# Patient Record
Sex: FEMALE | Race: WHITE
[De-identification: names, ages, dates, MRNs, and addresses within clinical notes are randomized per-mention and may not be internally consistent; named-entity substitution may affect disease eponyms.]

---

## 2018-09-28 NOTE — DIAGNOSTIC IMAGING REPORT
EXAMINATION: CT scan of the chest  with contrast.



TECHNIQUE: Helical CT images of the chest were performed from the lung apices

to the level of the adrenal glands after the intravenous administration of 100

cc of Isovue 300.  Coronal and sagittal reformatted images were obtained.Dose

modulation, iterative reconstruction, and/or weight based adjustment of the

mA/kV was utilized to reduce the radiation dose to as low as reasonably

achievable. 



COMPARISON: None.



CLINICAL HISTORY:Shortness of breath, evaluate for pulmonary



DISCUSSION:



LINES/TUBES:  None.



LUNGS AND AIRWAYS:  The lungs are clear.  No pulmonary nodules, masses or

consolidation.    The airways are normal, without endobronchial lesions.



No pulmonary blood.



PLEURA: No pneumothorax or pleural effusions.  



HEART AND MEDIASTINUM: The thyroid gland is normal.   The heart and pericardium

are within normal limits.



LYMPH NODES: There is no mediastinal, hilar or axillary lymphadenopathy.



ABDOMEN: Limited contrast-enhanced views of the upper abdomen show no

abnormality within the visualized liver, spleen, pancreas, or kidneys. The

adrenal glands are normal.



BONES AND SOFT TISSUES: No acute bony abnormalities.



IMPRESSION: 



No pulmonary embolism.





Signed by: Dr. Andrew Palisch, M.D. on 9/28/2018 3:07 PM

## 2020-02-04 NOTE — DIAGNOSTIC IMAGING REPORT
EXAMINATION:  CHEST SINGLE (NOT PORTABLE)    



INDICATION: Chest pain



COMPARISON: None

     

FINDINGS:



LINES/TUBES:None



LUNGS:The lungs are well-inflated. No focal consolidation or pulmonary edema.



PLEURA:No pleural effusion or pneumothorax.



MEDIASTINUM:The cardiomediastinal silhouette appears normal in size and shape.



BONES/SOFT TISSUES:No acute osseous injury.



ABDOMEN:No free air under the diaphragm.





IMPRESSION: 

No focal pneumonia or pulmonary edema.



Signed by: Korin Jones MD on 2/4/2020 3:57 PM

## 2020-02-04 NOTE — XMS REPORT
Patient Summary Document

                             Created on: 2020



FRANCO JEAN

External Reference #: [id number]

: 1975

Sex: Female



Demographics







                          Address                   [address] [place] [address]  [id number]

 

                          Home Phone                [phone]

 

                          Preferred Language        Unknown

 

                          Marital Status            Unknown

 

                          Mormonism Affiliation     Unknown

 

                          Race                      Unknown

 

                                        Additional Race(s)  

 

                          Ethnic Group              Unknown





Author







                          Author                    [facility]

 

                          [facility]

 

                          Address                   Unknown

 

                          Phone                     Unavailable







Care Team Providers







                    Care Team Member Name    Role                Phone

 

                    SWEET, A LAIRD      Unavailable         Unavailable

 

                    SHAD AHMADI       Unavailable         Unavailable







Problems

This patient has no known problems.



Allergies, Adverse Reactions, Alerts

This patient has no known allergies or adverse reactions.



Medications

This patient has no known medications.



Results







           Test Description    Test Time    Test Comments    Text Results    Atomic Results    Result

 Comments

 

                CHEST SINGLE (NOT PORTABLE)    2020 15:57:00                                               

                                                           [place] [address]      Patient Name: FRANCO JEAN                        
          MR #: [id number]                     : 1975                  
                Age/Sex: 44/F  Acct #: [id number]                             
Req #: [id number]  Adm Physician:                                               
      Ordered by: ULISES DUARTE MD                            Report #: [id number]
[id number]        Location: ER                                      Room/Bed:         
           
___________________________________________________________________________________________________
   Procedure: [id number] DX/CHEST SINGLE (NOT PORTABLE)  Exam Date: 20     
                      Exam Time: 1510                                           
  REPORT STATUS: Signed    EXAMINATION:  CHEST SINGLE (NOT PORTABLE)          
INDICATION: Chest pain      COMPARISON: None           FINDINGS:      
LINES/TUBES:None      LUNGS:The lungs are well-inflated. No focal consolidation 
or pulmonary edema.      PLEURA:No pleural effusion or pneumothorax.      
MEDIASTINUM:The cardiomediastinal silhouette appears normal in size and shape.  
   BONES/SOFT TISSUES:No acute osseous injury.      ABDOMEN:No free air under 
the diaphragm.         IMPRESSION:    No focal pneumonia or pulmonary edema.    
 Signed by: Sukhdeep Cornejo MD on 2020 3:57 PM        Dictated By: SUKHDEEP CORNEJO MD
 Electronically Signed By: SUKHDEEP CORNEJO MD on 20  Transcribed By: 
IVET on 20       COPY TO:   ULISES DUARTE MD              

 

                CT CHEST W      2018 15:01:00                        [place] [address]      Patient Name: 
FRANCO JEAN   MR #: [id number]    : 1975 Age/Sex: 43/F  Acct #: 
[id number] Req #: [id number]  Adm Physician:     Ordered by: MICAELA MCDANIELS MD  Report #: [id number]   Location: ER  Room/Bed:     
__________________________________________________________________________
_________________________    Procedure: [id number] CT/CT CHEST W  Exam Date: 
18                            Exam Time: 1443       REPORT STATUS: Signed 
  EXAMINATION: CT scan of the chest  with contrast.      TECHNIQUE: Helical CT 
images of the chest were performed from the lung apices   to the level of the 
adrenal glands after the intravenous administration of 100   cc of Isovue 300.  
Coronal and sagittal reformatted images were obtained.Dose   modulation, 
iterative reconstruction, and/or weight based adjustment of the   mA/kV was 
utilized to reduce the radiation dose to as low as reasonably   achievable.     
 COMPARISON: None.      CLINICAL HISTORY:Shortness of breath, evaluate for 
pulmonary      DISCUSSION:      LINES/TUBES:  None.      LUNGS AND AIRWAYS:  The
lungs are clear.  No pulmonary nodules, masses or   consolidation.    The 
airways are normal, without endobronchial lesions.      No pulmonary blood.     
PLEURA: No pneumothorax or pleural effusions.        HEART AND MEDIASTINUM: The 
thyroid gland is normal.   The heart and pericardium   are within normal limits.
     LYMPH NODES: There is no mediastinal, hilar or axillary lymphadenopathy.   
  ABDOMEN: Limited contrast-enhanced views of the upper abdomen show no   
abnormality within the visualized liver, spleen, pancreas, or kidneys. The   
adrenal glands are normal.      BONES AND SOFT TISSUES: No acute bony 
abnormalities.      IMPRESSION:       No pulmonary embolism.         Signed by: 
Dr. Andrew Palisch, M.D. on 2018 3:07 PM        Dictated By: ANDREW R PALISCH MD  Electronically Signed By: ANDREW R PALISCH MD on 18 1509  
Transcribed By: IVET on 18 2928       COPY TO:   MICAELA MCDANIELS MD

## 2020-09-30 ENCOUNTER — HOSPITAL ENCOUNTER (OUTPATIENT)
Dept: HOSPITAL 92 - DTY/OP | Age: 45
Discharge: HOME | End: 2020-09-30
Attending: SURGERY
Payer: COMMERCIAL

## 2020-09-30 DIAGNOSIS — E66.01: Primary | ICD-10-CM

## 2020-09-30 PROCEDURE — 97802 MEDICAL NUTRITION INDIV IN: CPT

## 2020-12-14 ENCOUNTER — HOSPITAL ENCOUNTER (OUTPATIENT)
Dept: HOSPITAL 92 - LABBT | Age: 45
Discharge: HOME | End: 2020-12-14
Attending: SURGERY
Payer: COMMERCIAL

## 2020-12-14 ENCOUNTER — HOSPITAL ENCOUNTER (INPATIENT)
Dept: HOSPITAL 92 - SURG A | Age: 45
LOS: 3 days | Discharge: HOME | DRG: 621 | End: 2020-12-17
Attending: SURGERY | Admitting: SURGERY
Payer: COMMERCIAL

## 2020-12-14 VITALS — BODY MASS INDEX: 37.5 KG/M2

## 2020-12-14 DIAGNOSIS — Z90.710: ICD-10-CM

## 2020-12-14 DIAGNOSIS — E66.01: Primary | ICD-10-CM

## 2020-12-14 DIAGNOSIS — G35: ICD-10-CM

## 2020-12-14 DIAGNOSIS — Z87.891: ICD-10-CM

## 2020-12-14 DIAGNOSIS — E66.01: ICD-10-CM

## 2020-12-14 DIAGNOSIS — Z20.828: ICD-10-CM

## 2020-12-14 DIAGNOSIS — F32.9: ICD-10-CM

## 2020-12-14 DIAGNOSIS — Z79.899: ICD-10-CM

## 2020-12-14 DIAGNOSIS — Z01.818: Primary | ICD-10-CM

## 2020-12-14 LAB
ALBUMIN SERPL BCG-MCNC: 4.8 G/DL (ref 3.5–5)
ALP SERPL-CCNC: 73 U/L (ref 40–110)
ALT SERPL W P-5'-P-CCNC: 17 U/L (ref 8–55)
ANION GAP SERPL CALC-SCNC: 15 MMOL/L (ref 10–20)
AST SERPL-CCNC: 22 U/L (ref 5–34)
BASOPHILS # BLD AUTO: 0.1 10X3/UL (ref 0–0.2)
BASOPHILS NFR BLD AUTO: 1.3 % (ref 0–2)
BILIRUB SERPL-MCNC: 0.4 MG/DL (ref 0.2–1.2)
BUN SERPL-MCNC: 17 MG/DL (ref 7–18.7)
CALCIUM SERPL-MCNC: 9.7 MG/DL (ref 7.8–10.44)
CHLORIDE SERPL-SCNC: 104 MMOL/L (ref 98–107)
CO2 SERPL-SCNC: 24 MMOL/L (ref 22–29)
CREAT CL PREDICTED SERPL C-G-VRATE: 0 ML/MIN (ref 70–130)
EOSINOPHIL # BLD AUTO: 0.2 10X3/UL (ref 0–0.5)
EOSINOPHIL NFR BLD AUTO: 5 % (ref 0–6)
GLOBULIN SER CALC-MCNC: 2.3 G/DL (ref 2.4–3.5)
GLUCOSE SERPL-MCNC: 80 MG/DL (ref 70–105)
HGB BLD-MCNC: 13.7 G/DL (ref 12–16)
LYMPHOCYTES NFR BLD AUTO: 18.1 % (ref 18–47)
MCH RBC QN AUTO: 30.1 PG (ref 27–33)
MCV RBC AUTO: 94.3 FL (ref 80–100)
MONOCYTES # BLD AUTO: 0.5 10X3/UL (ref 0–1.1)
MONOCYTES NFR BLD AUTO: 12.3 % (ref 0–10)
NEUTROPHILS # BLD AUTO: 2.4 10X3/UL (ref 1.5–8.4)
NEUTROPHILS NFR BLD AUTO: 63 % (ref 40–75)
PLATELET # BLD AUTO: 311 10X3/UL (ref 130–400)
POTASSIUM SERPL-SCNC: 4 MMOL/L (ref 3.5–5.1)
RBC # BLD AUTO: 4.55 10X6/UL (ref 3.9–5.2)
SODIUM SERPL-SCNC: 139 MMOL/L (ref 136–145)
WBC # BLD AUTO: 3.8 10X3/UL (ref 4.5–11)

## 2020-12-14 PROCEDURE — 87635 SARS-COV-2 COVID-19 AMP PRB: CPT

## 2020-12-14 PROCEDURE — 83036 HEMOGLOBIN GLYCOSYLATED A1C: CPT

## 2020-12-14 PROCEDURE — S0020 INJECTION, BUPIVICAINE HYDRO: HCPCS

## 2020-12-14 PROCEDURE — C9113 INJ PANTOPRAZOLE SODIUM, VIA: HCPCS

## 2020-12-14 PROCEDURE — 80048 BASIC METABOLIC PNL TOTAL CA: CPT

## 2020-12-14 PROCEDURE — 80053 COMPREHEN METABOLIC PANEL: CPT

## 2020-12-14 PROCEDURE — U0003 INFECTIOUS AGENT DETECTION BY NUCLEIC ACID (DNA OR RNA); SEVERE ACUTE RESPIRATORY SYNDROME CORONAVIRUS 2 (SARS-COV-2) (CORONAVIRUS DISEASE [COVID-19]), AMPLIFIED PROBE TECHNIQUE, MAKING USE OF HIGH THROUGHPUT TECHNOLOGIES AS DESCRIBED BY CMS-2020-01-R: HCPCS

## 2020-12-14 PROCEDURE — 36415 COLL VENOUS BLD VENIPUNCTURE: CPT

## 2020-12-14 PROCEDURE — 85025 COMPLETE CBC W/AUTO DIFF WBC: CPT

## 2020-12-14 PROCEDURE — 71046 X-RAY EXAM CHEST 2 VIEWS: CPT

## 2020-12-14 PROCEDURE — 88307 TISSUE EXAM BY PATHOLOGIST: CPT

## 2020-12-14 PROCEDURE — 88312 SPECIAL STAINS GROUP 1: CPT

## 2020-12-16 PROCEDURE — 0DB64Z3 EXCISION OF STOMACH, PERCUTANEOUS ENDOSCOPIC APPROACH, VERTICAL: ICD-10-PCS | Performed by: SURGERY

## 2020-12-16 RX ADMIN — POTASSIUM CHLORIDE, DEXTROSE MONOHYDRATE AND SODIUM CHLORIDE SCH: 150; 5; 450 INJECTION, SOLUTION INTRAVENOUS at 22:31

## 2020-12-16 RX ADMIN — POTASSIUM CHLORIDE, DEXTROSE MONOHYDRATE AND SODIUM CHLORIDE SCH MLS: 150; 5; 450 INJECTION, SOLUTION INTRAVENOUS at 20:33

## 2020-12-16 RX ADMIN — ONDANSETRON PRN MG: 2 INJECTION INTRAMUSCULAR; INTRAVENOUS at 22:35

## 2020-12-16 NOTE — RAD
EXAM:

Chest 2 views:



HISTORY:

Preoperative radiograph



COMPARISON:

None.



FINDINGS:

There is a normal-sized cardiomediastinal silhouette. There is no evidence of consolidation, mass, or
 pleural effusion.   No acute osseous abnormality.



IMPRESSION:

No evidence of acute cardiopulmonary disease



Reported By: Festus Denson 

Electronically Signed:  12/16/2020 10:24 AM

## 2020-12-16 NOTE — OP
DATE OF PROCEDURE:  12/16/2020



PREOPERATIVE DIAGNOSIS:  Morbid obesity with a body mass index of 40.



POSTOPERATIVE DIAGNOSIS:  Morbid obesity with a body mass index of 40.



PROCEDURE PERFORMED:  Laparoscopic sleeve gastrectomy with Ethicon staple line

reinforcement, 38-[place] bougie. 



ANESTHESIA:  General.



ESTIMATED BLOOD LOSS:  Minimal.



COMPLICATIONS:  None.



SPECIMEN:  Stomach.



FINDINGS:  Normal EGD.



TECHNIQUE:  The patient was taken to the operating room and laid supine on the

operating room table.  After general anesthetic was obtained, the arms and legs were

double strapped to bariatric table.  The abdomen was shaved, prepped, and draped in

a sterile fashion.  Left subcostal 5-mm Optiview trocar placed in usual fashion,

high-flow pneumoperitoneum was obtained.  Left and right abdominal 12 mm ports as

well as a right subcostal 5-mm port were placed under direct visualization.  5 mm

incision was made at the xiphoid and the Darshan was used to raise the liver off

the GE junction.  Short gastrics were taken down from a distance of 6 cm proximal to

the pylorus, all the way up to the left dandy of the diaphragm.  Left dandy, posterior

fundus, angle of His were completely dissected.  OG tube was removed and 38 bougie

was brought in, tip left in the antrum of the stomach.  Multiple loads of [place]

stapling device with Ethicon staple line reinforcements used to perform the sleeve.

The first was fired up at the distance of 6 cm proximal to the pylorus, angled up

towards the incisura.  Multiple loads were then fired up along the bougie.  Stomach

was completely transected at the angle of His.  The stomach was removed from left

abdominal incision.  This fascial defect was closed using a GraNee needle, Vicryl

tie.  EGD scope was passed through esophagus, stomach, to the level of duodenum

without obstruction.  There was no stricture at the incisura.  EGD scope was used to

decompress stomach, it was pulled and removed.  The Darshan retractor was removed

under direct visualization without bleeding.  All port sites were removed under

direct visualization without bleeding.  Pneumoperitoneum was let down.  4-0 Monocryl

and Dermabond closed all skin incisions.  The patient was en route to Recovery in

stable condition.  All instrument counts, needle counts, and lap counts were

correct. 







Job ID:  [id number]

## 2020-12-17 VITALS — TEMPERATURE: 97.4 F | SYSTOLIC BLOOD PRESSURE: 138 MMHG | DIASTOLIC BLOOD PRESSURE: 84 MMHG

## 2020-12-17 LAB
ANION GAP SERPL CALC-SCNC: 11 MMOL/L (ref 10–20)
BASOPHILS # BLD AUTO: 0 THOU/UL (ref 0–0.2)
BASOPHILS NFR BLD AUTO: 0.4 % (ref 0–1)
BUN SERPL-MCNC: 8 MG/DL (ref 7–18.7)
CALCIUM SERPL-MCNC: 8.6 MG/DL (ref 7.8–10.44)
CHLORIDE SERPL-SCNC: 103 MMOL/L (ref 98–107)
CO2 SERPL-SCNC: 26 MMOL/L (ref 22–29)
CREAT CL PREDICTED SERPL C-G-VRATE: 162 ML/MIN (ref 70–130)
EOSINOPHIL # BLD AUTO: 0 THOU/UL (ref 0–0.7)
EOSINOPHIL NFR BLD AUTO: 0.2 % (ref 0–10)
GLUCOSE SERPL-MCNC: 107 MG/DL (ref 70–105)
HGB BLD-MCNC: 12.7 G/DL (ref 12–16)
LYMPHOCYTES # BLD: 0.5 THOU/UL (ref 1.2–3.4)
LYMPHOCYTES NFR BLD AUTO: 5.4 % (ref 21–51)
MCH RBC QN AUTO: 31.5 PG (ref 27–31)
MCV RBC AUTO: 94.9 FL (ref 78–98)
MONOCYTES # BLD AUTO: 0.5 THOU/UL (ref 0.11–0.59)
MONOCYTES NFR BLD AUTO: 6.1 % (ref 0–10)
NEUTROPHILS # BLD AUTO: 7.7 THOU/UL (ref 1.4–6.5)
NEUTROPHILS NFR BLD AUTO: 87.9 % (ref 42–75)
PLATELET # BLD AUTO: 270 THOU/UL (ref 130–400)
POTASSIUM SERPL-SCNC: 4.3 MMOL/L (ref 3.5–5.1)
RBC # BLD AUTO: 4.03 MILL/UL (ref 4.2–5.4)
SODIUM SERPL-SCNC: 136 MMOL/L (ref 136–145)
WBC # BLD AUTO: 8.7 THOU/UL (ref 4.8–10.8)

## 2020-12-17 RX ADMIN — ONDANSETRON PRN MG: 2 INJECTION INTRAMUSCULAR; INTRAVENOUS at 10:10

## 2020-12-17 RX ADMIN — POTASSIUM CHLORIDE, DEXTROSE MONOHYDRATE AND SODIUM CHLORIDE SCH MLS: 150; 5; 450 INJECTION, SOLUTION INTRAVENOUS at 10:10

## 2020-12-17 RX ADMIN — POTASSIUM CHLORIDE, DEXTROSE MONOHYDRATE AND SODIUM CHLORIDE SCH MLS: 150; 5; 450 INJECTION, SOLUTION INTRAVENOUS at 03:17

## 2020-12-17 NOTE — PDOC.GSPN
Surgery Progress Note: Subj





- Subjective


Narrative: 





Ms. Lee is a 45 year old female who is one day s/p sleeve gastrectomy. She 

complains of ongoing nausea despite her anti-emetics and mild abdominal 

soreness. However, she has been able to eat a clear liquid diet with no 

vomiting. She will monitor when she feels the nausea to determine if it is 

related to eating too much at one time. She is drinking plenty of fluids. She 

has been able to ambulate without difficulty. Denies any fever, chills, 

vomiting, SOB, chest pain, dysuria. 





Surgery Progress Note: Obj





- Vital signs


Vital signs: 


                            Vital Signs - Most Recent











Temp Pulse Resp BP Pulse Ox


 


 98.1 F   67   14   137/75   92 L


 


 12/17/20 08:00  12/17/20 08:00  12/17/20 08:00  12/17/20 08:00  12/17/20 08:00














- Physical Exam


General: no distress, well developed, well nourished


Cardiovascular: regular rate and rhythm, no murmur


Respiratory: clear to auscultation


Abdomen: soft, positive bowel sounds, appropriately tender


Wound: dressing clean,dry,intact (5 port site incisions healing well with mild 

surrounding bruising)





Surgery Progress Note: Results





- Labs


Result Diagrams: 


                                 12/17/20 05:12





                                 12/17/20 05:12


Lab results: 


                         Laboratory Results - last 12 hr











  12/17/20 12/17/20





  05:12 05:12


 


WBC   8.7


 


RBC   4.03 L


 


Hgb   12.7


 


Hct   38.3


 


MCV   94.9


 


MCH   31.5 H


 


MCHC   33.2


 


RDW   11.2 L


 


Plt Count   270


 


MPV   8.4


 


Neutrophils %   87.9 H


 


Lymphocytes %   5.4 L


 


Monocytes %   6.1


 


Eosinophils %   0.2


 


Basophils %   0.4


 


Neutrophils #   7.7 H


 


Lymphocytes #   0.5 L


 


Monocytes #   0.5


 


Eosinophils #   0.0


 


Basophils #   0.0


 


Sodium  136 


 


Potassium  4.3 


 


Chloride  103 


 


Carbon Dioxide  26 


 


Anion Gap  11 


 


BUN  8 


 


Creatinine  0.73 


 


Estimated GFR (MDRD)  86 


 


Glucose  107 H 


 


Calcium  8.6 














Surgery Progress Note: A/P





- Plan


Plan: 





s/p sleeve gastrectomy: Patient is tolerating liquid diet and fluid intake well,

although she has some mild persistent nausea. She is ambulating without 

difficulty. Will discharge patient home today with pain regime and anti-emetics 

and have her follow up in the office in 2 weeks.

## 2023-01-31 ENCOUNTER — HOSPITAL ENCOUNTER (EMERGENCY)
Dept: HOSPITAL 88 - ER | Age: 48
Discharge: HOME | End: 2023-01-31
Payer: SELF-PAY

## 2023-01-31 VITALS — HEIGHT: 66 IN | BODY MASS INDEX: 34.55 KG/M2 | WEIGHT: 215 LBS

## 2023-01-31 DIAGNOSIS — M54.50: Primary | ICD-10-CM

## 2023-01-31 PROCEDURE — 99282 EMERGENCY DEPT VISIT SF MDM: CPT
